# Patient Record
Sex: MALE | Race: WHITE
[De-identification: names, ages, dates, MRNs, and addresses within clinical notes are randomized per-mention and may not be internally consistent; named-entity substitution may affect disease eponyms.]

---

## 2020-08-31 ENCOUNTER — HOSPITAL ENCOUNTER (EMERGENCY)
Dept: HOSPITAL 96 - M.ERS | Age: 55
Discharge: HOME | End: 2020-08-31
Payer: COMMERCIAL

## 2020-08-31 VITALS — DIASTOLIC BLOOD PRESSURE: 76 MMHG | SYSTOLIC BLOOD PRESSURE: 115 MMHG

## 2020-08-31 VITALS — HEIGHT: 65 IN | BODY MASS INDEX: 36.82 KG/M2 | WEIGHT: 221.01 LBS

## 2020-08-31 DIAGNOSIS — E11.65: Primary | ICD-10-CM

## 2020-08-31 LAB
ABSOLUTE BASOPHILS: 0.1 THOU/UL (ref 0–0.2)
ABSOLUTE EOSINOPHILS: 0.1 THOU/UL (ref 0–0.7)
ABSOLUTE MONOCYTES: 0.4 THOU/UL (ref 0–1.2)
ALBUMIN SERPL-MCNC: 4 G/DL (ref 3.4–5)
ALP SERPL-CCNC: 154 U/L (ref 46–116)
ALT SERPL-CCNC: 28 U/L (ref 30–65)
ANION GAP SERPL CALC-SCNC: 12 MMOL/L (ref 7–16)
AST SERPL-CCNC: 22 U/L (ref 15–37)
BASOPHILS NFR BLD AUTO: 1 %
BILIRUB SERPL-MCNC: 0.8 MG/DL
BILIRUB UR-MCNC: NEGATIVE MG/DL
BUN SERPL-MCNC: 21 MG/DL (ref 7–18)
CALCIUM SERPL-MCNC: 9.5 MG/DL (ref 8.5–10.1)
CHLORIDE SERPL-SCNC: 94 MMOL/L (ref 98–107)
CO2 SERPL-SCNC: 24 MMOL/L (ref 21–32)
COLOR UR: YELLOW
CREAT SERPL-MCNC: 1.6 MG/DL (ref 0.6–1.3)
EOSINOPHIL NFR BLD: 1.9 %
GLUCOSE SERPL-MCNC: 486 MG/DL (ref 70–99)
GRANULOCYTES NFR BLD MANUAL: 59.1 %
HCT VFR BLD CALC: 43.5 % (ref 42–52)
HGB BLD-MCNC: 15.8 GM/DL (ref 14–18)
KETONES UR STRIP-MCNC: (no result) MG/DL
LYMPHOCYTES # BLD: 2.7 THOU/UL (ref 0.8–5.3)
LYMPHOCYTES NFR BLD AUTO: 33.6 %
MCH RBC QN AUTO: 33.1 PG (ref 26–34)
MCHC RBC AUTO-ENTMCNC: 36.4 G/DL (ref 28–37)
MCV RBC: 90.8 FL (ref 80–100)
MONOCYTES NFR BLD: 4.4 %
MPV: 10.5 FL. (ref 7.2–11.1)
NEUTROPHILS # BLD: 4.7 THOU/UL (ref 1.6–8.1)
NUCLEATED RBCS: 0 /100WBC
PLATELET COUNT*: 212 THOU/UL (ref 150–400)
POTASSIUM SERPL-SCNC: 4.2 MMOL/L (ref 3.5–5.1)
PROT SERPL-MCNC: 8.1 G/DL (ref 6.4–8.2)
PROT UR QL STRIP: (no result)
RBC # BLD AUTO: 4.79 MIL/UL (ref 4.5–6)
RBC # UR STRIP: (no result) /UL
RDW-CV: 13.4 % (ref 10.5–14.5)
SODIUM SERPL-SCNC: 130 MMOL/L (ref 136–145)
SP GR UR STRIP: 1.01 (ref 1–1.03)
URINE CLARITY: CLEAR
URINE GLUCOSE-RANDOM: (no result)
URINE LEUKOCYTES-REFLEX: NEGATIVE
URINE NITRITE-REFLEX: NEGATIVE
UROBILINOGEN UR STRIP-ACNC: 0.2 E.U./DL (ref 0.2–1)
WBC # BLD AUTO: 8 THOU/UL (ref 4–11)

## 2020-09-01 NOTE — EKG
[place] [address]
Phone:  [phone]                     ELECTROCARDIOGRAM REPORT      
_______________________________________________________________________________
 
Name:         KALEY REARDON        Room:                     [facility]#:    [id number]     Account #:     [id number]  
Admission:    20    Attend Phys:                     
Discharge:    20    Date of Birth: [birth year]  
Date of Service: 20 1136  Report #:      [id number]
        [id number]
_______________________________________________________________________________
THIS REPORT FOR:  //name//                      
 
                         [facility] ED
                                       
Test Date:    2020               Test Time:    11:36:02
Pat Name:     KALEY REARDON          Department:   
Patient ID:   [id number]            Room:          
Gender:                               Technician:   ISHAN
:          1965               Requested By: Gian Dyer
Order Number: [id number] MD:   Shay Dominguez
                                 Measurements
Intervals                              Axis          
Rate:         74                       P:            49
WY:           159                      QRS:          2
QRSD:         104                      T:            30
QT:           487                                    
QTc:          541                                    
                           Interpretive Statements
Sinus rhythm
Probable left atrial enlargement
Abnormal R-wave progression, early transition
Prolonged QT interval
Compared to ECG 2017 22:53:40
Prolonged QT interval now present
Sinus bradycardia no longer present
ST (T wave) deviation no longer present
Electronically Signed On 2020 16:21:16 CDT by Shay Dominguez
[email or web address]
 
 
 
 
 
 
 
 
 
 
 
 
 
 
 
 
  <ELECTRONICALLY SIGNED>
                                           By: Shay Dominguez MD, [facility]     
  20     1621
D: 20 1136   _____________________________________
T: 20 1136   Shay Dominguez MD, [facility]       /EPI

## 2021-12-22 ENCOUNTER — HOSPITAL ENCOUNTER (EMERGENCY)
Dept: HOSPITAL 96 - M.ERS | Age: 56
Discharge: HOME | End: 2021-12-22
Payer: COMMERCIAL

## 2021-12-22 VITALS — SYSTOLIC BLOOD PRESSURE: 158 MMHG | DIASTOLIC BLOOD PRESSURE: 92 MMHG

## 2021-12-22 VITALS — BODY MASS INDEX: 37.49 KG/M2 | WEIGHT: 225 LBS | HEIGHT: 65 IN

## 2021-12-22 DIAGNOSIS — E11.9: ICD-10-CM

## 2021-12-22 DIAGNOSIS — G51.0: Primary | ICD-10-CM

## 2021-12-22 DIAGNOSIS — Z79.899: ICD-10-CM

## 2021-12-22 LAB
ABSOLUTE BASOPHILS: 0.1 THOU/UL (ref 0–0.2)
ABSOLUTE EOSINOPHILS: 0.3 THOU/UL (ref 0–0.7)
ABSOLUTE MONOCYTES: 0.4 THOU/UL (ref 0–1.2)
ALBUMIN SERPL-MCNC: 3.8 G/DL (ref 3.4–5)
ALP SERPL-CCNC: 84 U/L (ref 46–116)
ALT SERPL-CCNC: 27 U/L (ref 30–65)
ANION GAP SERPL CALC-SCNC: 9 MMOL/L (ref 7–16)
AST SERPL-CCNC: 26 U/L (ref 15–37)
BASOPHILS NFR BLD AUTO: 1 %
BILIRUB SERPL-MCNC: 0.6 MG/DL
BUN SERPL-MCNC: 16 MG/DL (ref 7–18)
CALCIUM SERPL-MCNC: 8.9 MG/DL (ref 8.5–10.1)
CHLORIDE SERPL-SCNC: 103 MMOL/L (ref 98–107)
CO2 SERPL-SCNC: 27 MMOL/L (ref 21–32)
CREAT SERPL-MCNC: 1.3 MG/DL (ref 0.6–1.3)
EOSINOPHIL NFR BLD: 3.9 %
GLUCOSE SERPL-MCNC: 131 MG/DL (ref 70–99)
GRANULOCYTES NFR BLD MANUAL: 55.6 %
HCT VFR BLD CALC: 42.3 % (ref 42–52)
HGB BLD-MCNC: 14.6 GM/DL (ref 14–18)
LYMPHOCYTES # BLD: 2.7 THOU/UL (ref 0.8–5.3)
LYMPHOCYTES NFR BLD AUTO: 34.4 %
MCH RBC QN AUTO: 31.4 PG (ref 26–34)
MCHC RBC AUTO-ENTMCNC: 34.4 G/DL (ref 28–37)
MCV RBC: 91 FL (ref 80–100)
MONOCYTES NFR BLD: 5.1 %
MPV: 8.8 FL. (ref 7.2–11.1)
NEUTROPHILS # BLD: 4.4 THOU/UL (ref 1.6–8.1)
NUCLEATED RBCS: 0 /100WBC
PLATELET COUNT*: 223 THOU/UL (ref 150–400)
POTASSIUM SERPL-SCNC: 4 MMOL/L (ref 3.5–5.1)
PROT SERPL-MCNC: 7.4 G/DL (ref 6.4–8.2)
RBC # BLD AUTO: 4.65 MIL/UL (ref 4.5–6)
RDW-CV: 13.9 % (ref 10.5–14.5)
SODIUM SERPL-SCNC: 139 MMOL/L (ref 136–145)
WBC # BLD AUTO: 7.9 THOU/UL (ref 4–11)

## 2021-12-22 NOTE — EKG
[place] [address]
Phone:  [phone]                     ELECTROCARDIOGRAM REPORT      
_______________________________________________________________________________
 
Name:         KALEY REARDON        Room:                     [facility] 
M.R.#:    [id number]     Account #:     [id number]  
Admission:    21    Attend Phys:                     
Discharge:                Date of Birth: [birth year]  
Date of Service: 21 1104  Report #:      [id number]
        [id number]
_______________________________________________________________________________
THIS REPORT FOR:  //name//                      
 
                         [facility] ED
                                       
Test Date:    2021               Test Time:    11:04:57
Pat Name:     KALEY REARDON          Department:   
Patient ID:   [id number]            Room:          
Gender:                               Technician:   
:          1965               Requested By: Igor Villegas
Order Number: [id number] MD:   Sander Niño
                                 Measurements
Intervals                              Axis          
Rate:         68                       P:            47
FL:           166                      QRS:          5
QRSD:         97                       T:            25
QT:           382                                    
QTc:          407                                    
                           Interpretive Statements
Sinus rhythm
Compared to ECG 2020 11:36:02
Prolonged QT interval no longer present
Electronically Signed On 2021 14:33:37 CST by Sander Niño
[email or web address]
 
 
 
 
 
 
 
 
 
 
 
 
 
 
 
 
 
 
 
 
 
  <ELECTRONICALLY SIGNED>
                                           By: Sander Niño MD, [facility]      
  21     1433
D: 21 1104   _____________________________________
T: 21   Sander Niño MD, [facility]        /EPI

## 2022-01-14 ENCOUNTER — HOSPITAL ENCOUNTER (EMERGENCY)
Dept: HOSPITAL 96 - M.ERS | Age: 57
Discharge: HOME | End: 2022-01-14
Payer: COMMERCIAL

## 2022-01-14 VITALS — DIASTOLIC BLOOD PRESSURE: 70 MMHG | SYSTOLIC BLOOD PRESSURE: 120 MMHG

## 2022-01-14 VITALS — HEIGHT: 66 IN | BODY MASS INDEX: 35.84 KG/M2 | WEIGHT: 223 LBS

## 2022-01-14 DIAGNOSIS — R11.0: ICD-10-CM

## 2022-01-14 DIAGNOSIS — Z79.899: ICD-10-CM

## 2022-01-14 DIAGNOSIS — E11.9: ICD-10-CM

## 2022-01-14 DIAGNOSIS — Z20.822: ICD-10-CM

## 2022-01-14 DIAGNOSIS — J18.9: Primary | ICD-10-CM

## 2022-01-14 DIAGNOSIS — Z90.5: ICD-10-CM

## 2022-01-14 DIAGNOSIS — R10.9: ICD-10-CM

## 2022-01-14 LAB
ABSOLUTE BASOPHILS: 0.1 THOU/UL (ref 0–0.2)
ABSOLUTE EOSINOPHILS: 0.2 THOU/UL (ref 0–0.7)
ABSOLUTE MONOCYTES: 0.6 THOU/UL (ref 0–1.2)
ALBUMIN SERPL-MCNC: 3.5 G/DL (ref 3.4–5)
ALP SERPL-CCNC: 94 U/L (ref 46–116)
ALT SERPL-CCNC: 26 U/L (ref 30–65)
ANION GAP SERPL CALC-SCNC: 9 MMOL/L (ref 7–16)
AST SERPL-CCNC: 21 U/L (ref 15–37)
BASOPHILS NFR BLD AUTO: 1.2 %
BILIRUB SERPL-MCNC: 0.2 MG/DL
BILIRUB UR-MCNC: NEGATIVE MG/DL
BUN SERPL-MCNC: 13 MG/DL (ref 7–18)
CALCIUM SERPL-MCNC: 9.2 MG/DL (ref 8.5–10.1)
CHLORIDE SERPL-SCNC: 104 MMOL/L (ref 98–107)
CO2 SERPL-SCNC: 27 MMOL/L (ref 21–32)
COLOR UR: YELLOW
CREAT SERPL-MCNC: 1.3 MG/DL (ref 0.6–1.3)
EOSINOPHIL NFR BLD: 2.1 %
GLUCOSE SERPL-MCNC: 153 MG/DL (ref 70–99)
GRANULOCYTES NFR BLD MANUAL: 68.8 %
HCT VFR BLD CALC: 39.4 % (ref 42–52)
HGB BLD-MCNC: 13.6 GM/DL (ref 14–18)
KETONES UR STRIP-MCNC: NEGATIVE MG/DL
LYMPHOCYTES # BLD: 1.9 THOU/UL (ref 0.8–5.3)
LYMPHOCYTES NFR BLD AUTO: 21.3 %
MCH RBC QN AUTO: 31.4 PG (ref 26–34)
MCHC RBC AUTO-ENTMCNC: 34.5 G/DL (ref 28–37)
MCV RBC: 91.1 FL (ref 80–100)
MONOCYTES NFR BLD: 6.6 %
MPV: 9 FL. (ref 7.2–11.1)
NEUTROPHILS # BLD: 6.2 THOU/UL (ref 1.6–8.1)
NUCLEATED RBCS: 0 /100WBC
PLATELET COUNT*: 214 THOU/UL (ref 150–400)
POTASSIUM SERPL-SCNC: 3.9 MMOL/L (ref 3.5–5.1)
PROT SERPL-MCNC: 7 G/DL (ref 6.4–8.2)
PROT UR QL STRIP: NEGATIVE
RBC # BLD AUTO: 4.33 MIL/UL (ref 4.5–6)
RBC # UR STRIP: NEGATIVE /UL
RDW-CV: 13.7 % (ref 10.5–14.5)
SODIUM SERPL-SCNC: 140 MMOL/L (ref 136–145)
SP GR UR STRIP: 1.01 (ref 1–1.03)
URINE CLARITY: CLEAR
URINE GLUCOSE-RANDOM: NEGATIVE
URINE LEUKOCYTES-REFLEX: NEGATIVE
URINE NITRITE-REFLEX: NEGATIVE
UROBILINOGEN UR STRIP-ACNC: 0.2 E.U./DL (ref 0.2–1)
WBC # BLD AUTO: 9 THOU/UL (ref 4–11)